# Patient Record
Sex: FEMALE | Race: WHITE
[De-identification: names, ages, dates, MRNs, and addresses within clinical notes are randomized per-mention and may not be internally consistent; named-entity substitution may affect disease eponyms.]

---

## 2019-07-01 ENCOUNTER — HOSPITAL ENCOUNTER (EMERGENCY)
Dept: HOSPITAL 63 - ER | Age: 44
Discharge: ANOTHER HOSPITAL | End: 2019-07-01
Payer: COMMERCIAL

## 2019-07-01 VITALS — BODY MASS INDEX: 27 KG/M2 | HEIGHT: 66 IN | WEIGHT: 168 LBS

## 2019-07-01 VITALS — SYSTOLIC BLOOD PRESSURE: 120 MMHG | DIASTOLIC BLOOD PRESSURE: 65 MMHG

## 2019-07-01 DIAGNOSIS — R11.2: ICD-10-CM

## 2019-07-01 DIAGNOSIS — Z90.49: ICD-10-CM

## 2019-07-01 DIAGNOSIS — R42: ICD-10-CM

## 2019-07-01 DIAGNOSIS — K37: Primary | ICD-10-CM

## 2019-07-01 LAB
ALBUMIN SERPL-MCNC: 3.5 G/DL (ref 3.4–5)
ALBUMIN/GLOB SERPL: 0.8 {RATIO} (ref 1–1.7)
ALP SERPL-CCNC: 126 U/L (ref 46–116)
ALT SERPL-CCNC: 98 U/L (ref 14–59)
ANION GAP SERPL CALC-SCNC: 11 MMOL/L (ref 6–14)
APTT PPP: (no result) S
AST SERPL-CCNC: 44 U/L (ref 15–37)
BACTERIA #/AREA URNS HPF: (no result) /HPF
BASOPHILS # BLD AUTO: 0.1 X10^3/UL (ref 0–0.2)
BASOPHILS NFR BLD: 1 % (ref 0–3)
BILIRUB SERPL-MCNC: 1.8 MG/DL (ref 0.2–1)
BILIRUB UR QL STRIP: (no result)
BUN/CREAT SERPL: 9 (ref 6–20)
CA-I SERPL ISE-MCNC: 8 MG/DL (ref 7–20)
CALCIUM SERPL-MCNC: 9.4 MG/DL (ref 8.5–10.1)
CHLORIDE SERPL-SCNC: 100 MMOL/L (ref 98–107)
CO2 SERPL-SCNC: 25 MMOL/L (ref 21–32)
CREAT SERPL-MCNC: 0.9 MG/DL (ref 0.6–1)
EOSINOPHIL NFR BLD: 0 % (ref 0–3)
EOSINOPHIL NFR BLD: 0 X10^3/UL (ref 0–0.7)
ERYTHROCYTE [DISTWIDTH] IN BLOOD BY AUTOMATED COUNT: 13.8 % (ref 11.5–14.5)
FIBRINOGEN PPP-MCNC: (no result) MG/DL
GFR SERPLBLD BASED ON 1.73 SQ M-ARVRAT: 68.7 ML/MIN
GLOBULIN SER-MCNC: 4.6 G/DL (ref 2.2–3.8)
GLUCOSE SERPL-MCNC: 108 MG/DL (ref 70–99)
GLUCOSE UR STRIP-MCNC: (no result) MG/DL
HCT VFR BLD CALC: 40.5 % (ref 36–47)
HGB BLD-MCNC: 13.6 G/DL (ref 12–15.5)
LIPASE: 68 U/L (ref 73–393)
LYMPHOCYTES # BLD: 1.4 X10^3/UL (ref 1–4.8)
LYMPHOCYTES NFR BLD AUTO: 11 % (ref 24–48)
MCH RBC QN AUTO: 30 PG (ref 25–35)
MCHC RBC AUTO-ENTMCNC: 34 G/DL (ref 31–37)
MCV RBC AUTO: 88 FL (ref 79–100)
MONO #: 0.8 X10^3/UL (ref 0–1.1)
MONOCYTES NFR BLD: 6 % (ref 0–9)
NEUT #: 10.7 X10^3UL (ref 1.8–7.7)
NEUTROPHILS NFR BLD AUTO: 82 % (ref 31–73)
NITRITE UR QL STRIP: (no result)
PLATELET # BLD AUTO: 443 X10^3/UL (ref 140–400)
POTASSIUM SERPL-SCNC: 3.5 MMOL/L (ref 3.5–5.1)
PROT SERPL-MCNC: 8.1 G/DL (ref 6.4–8.2)
RBC # BLD AUTO: 4.58 X10^6/UL (ref 3.5–5.4)
RBC #/AREA URNS HPF: (no result) /HPF (ref 0–2)
SODIUM SERPL-SCNC: 136 MMOL/L (ref 136–145)
SP GR UR STRIP: 1.02
SQUAMOUS #/AREA URNS LPF: (no result) /LPF
UROBILINOGEN UR-MCNC: 2 MG/DL
WBC # BLD AUTO: 13 X10^3/UL (ref 4–11)
WBC #/AREA URNS HPF: (no result) /HPF (ref 0–4)

## 2019-07-01 PROCEDURE — 74177 CT ABD & PELVIS W/CONTRAST: CPT

## 2019-07-01 PROCEDURE — 93005 ELECTROCARDIOGRAM TRACING: CPT

## 2019-07-01 PROCEDURE — 36415 COLL VENOUS BLD VENIPUNCTURE: CPT

## 2019-07-01 PROCEDURE — 96361 HYDRATE IV INFUSION ADD-ON: CPT

## 2019-07-01 PROCEDURE — 81001 URINALYSIS AUTO W/SCOPE: CPT

## 2019-07-01 PROCEDURE — 99285 EMERGENCY DEPT VISIT HI MDM: CPT

## 2019-07-01 PROCEDURE — 85025 COMPLETE CBC W/AUTO DIFF WBC: CPT

## 2019-07-01 PROCEDURE — 96365 THER/PROPH/DIAG IV INF INIT: CPT

## 2019-07-01 PROCEDURE — 80053 COMPREHEN METABOLIC PANEL: CPT

## 2019-07-01 PROCEDURE — 83690 ASSAY OF LIPASE: CPT

## 2019-07-01 PROCEDURE — 81025 URINE PREGNANCY TEST: CPT

## 2019-07-01 PROCEDURE — 96375 TX/PRO/DX INJ NEW DRUG ADDON: CPT

## 2019-07-01 NOTE — EKG
[facility] [address], [place] [address]

Test Date:    2019               Test Time:    13:55:53

Pat Name:     SHERYL SMILEY             Department:   

Patient ID:   [id number]           Room:          

Gender:       F                        Technician:   

:          1975               Requested By: MARIA INES BAILEY

Order Number: [id number]            Reading MD:     

                                 Measurements

Intervals                              Axis          

Rate:         96                       P:            56

MN:           124                      QRS:          45

QRSD:         68                       T:            36

QT:           320                                    

QTc:          410                                    

                           Interpretive Statements

SINUS RHYTHM

QRS(T) CONTOUR ABNORMALITY

CONSIDER ANTEROLATERAL MYOCARDIAL DAMAGE

POSSIBLY ABNORMAL ECG

RI6.01

No previous ECG available for comparison

## 2019-07-01 NOTE — PHYS DOC
Past History


Past Medical History:  No Pertinent History


Past Surgical History:  Cholecystectomy


Alcohol Use:  Occasionally


Drug Use:  None





Adult General


Chief Complaint


Chief Complaint:  ABDOMINAL PAIN





HPI


HPI





Patient is a 44 year old female who presents with a 4 day history of abdominal 

pain. She reports onset of pain starting Thursday (6/27) and was diffuse, with 

the left side and periumbilical region hurting the worse. She started feeling 

very nauseous and lightheaded, causing her to feint. After this incident she 

started having multiple episodes of emesis, with the last episode occurring 

Friday. Her pain has become more localized since friday, and now it is 

predominantly in the RLQ. She rates it a 7/10 and describes it as a pulsating 

pain that changes from sharp to dull. Last night she had a temperature of 100.7 

degrees F. Her only abdominal surgery was a cholecystectomy in September of 2016

or 2017. She denies any sick contacts, diarrhea, blood in her stool, or chest 

pain. She reported having a "small" bowel movement earlier this morning.





Review of Systems


Review of Systems





Constitutional:Admits to feeling feverish. Denies chills.


Eyes: Denies change in visual acuity, redness, or eye pain 


HENT: Denies nasal congestion or sore throat 


Respiratory: Denies cough. Admits to pain-induced shortness of breath 


Cardiovascular: No additional information not addressed in HPI 


GI: Denies, bloody stools or diarrhea. Admits to RLQ abdominal pain, nausea and 

vomiting. 


: Admits dysuria


Musculoskeletal: Denies back pain or joint pain 


Integument: Denies rash or skin lesions 


Neurologic: Denies focal weakness or sensory changes. Admits to headache





All other systems were reviewed and found to be within normal limits, except as 

documented in this note.





Current Medications


Current Medications





Current Medications








 Medications


  (Trade)  Dose


 Ordered  Sig/Jose  Start Time


 Stop Time Status Last Admin


Dose Admin


 


 Fentanyl Citrate


  (Fentanyl 2ml


 Vial)  50 mcg  1X  ONCE  7/1/19 14:00


 7/1/19 14:09 DC 7/1/19 14:15


50 MCG


 


 Iohexol


  (Omnipaque 300


 Mg/ml)  75 ml  1X  ONCE  7/1/19 14:15


 7/1/19 14:16 DC 7/1/19 14:52


75 ML


 


 Ondansetron HCl


  (Zofran)  4 mg  1X  ONCE  7/1/19 14:00


 7/1/19 14:09 DC 7/1/19 14:13


4 MG


 


 Sodium Chloride  1,000 ml @ 


 1,000 mls/hr  1X  ONCE  7/1/19 14:00


 7/1/19 14:59 DC 7/1/19 14:11


1,000 MLS/HR











Allergies


Allergies





Allergies








Coded Allergies Type Severity Reaction Last Updated Verified


 


  No Known Drug Allergies    7/1/19 No











Physical Exam


Physical Exam





Constitutional: Well developed, well nourished, pt in mild distress d/t pain.


HENT: Normocephalic, atraumatic, bilateral external ears normal, oropharynx 

moist, no oral exudates, nose normal. 


Eyes: PERRLA, EOMI, conjunctiva normal, no discharge. 


Neck: Normal range of motion, no tenderness, supple, no stridor. 


Cardiovascular:Heart rate regular rhythm, no murmur 


Lungs & Thorax:  Bilateral breath sounds clear to auscultation 


Abdomen: Bowel sounds normal, soft, no masses, no pulsatile masses. TTP in RLQ, 

positive Psoas sign on right. 


Skin: Warm, dry, no erythema, no rash. 


Back: No tenderness, slight CVA tenderness. 


Extremities: No tenderness, no cyanosis, no clubbing, ROM intact, no edema. 


Neurologic: Alert and oriented X 3, normal motor function, normal sensory 

function, no focal deficits noted.





Current Patient Data


Vital Signs





                                   Vital Signs








  Date Time  Temp Pulse Resp B/P (MAP) Pulse Ox O2 Delivery O2 Flow Rate FiO2


 


7/1/19 15:00  90 20 120/66 (84) 96 Room Air  


 


7/1/19 13:15 98.5       








Lab Results





                                Laboratory Tests








Test


 7/1/19


13:20 7/1/19


13:35 7/1/19


13:53


 


Urine Collection Type Unknown    


 


Urine Color Carol    


 


Urine Clarity Cloudy    


 


Urine pH 5.5    


 


Urine Specific Gravity 1.025    


 


Urine Protein


 100 mg/dl


(NEG-TRACE) 


 





 


Urine Glucose (UA)


 Neg mg/dL


(NEG) 


 





 


Urine Ketones (Stick)


 >=160 mg/dL


(NEG) 


 





 


Urine Blood Trace (NEG)    


 


Urine Nitrite Neg (NEG)    


 


Urine Bilirubin Mod (NEG)    


 


Urine Urobilinogen Dipstick


 2 mg/dL (0.2


mg/dL) 


 





 


Urine Leukocyte Esterase Neg (NEG)    


 


Urine RBC


 1-2 /HPF (0-2)


 


 





 


Urine WBC


 Occ /HPF (0-4)


 


 





 


Urine Squamous Epithelial


Cells Many /LPF  


 


 





 


Urine Bacteria


 Few /HPF


(0-FEW) 


 





 


Urine Mucus Mod /LPF    


 


White Blood Count


 


 13.0 x10^3/uL


(4.0-11.0)  H 





 


Red Blood Count


 


 4.58 x10^6/uL


(3.50-5.40) 





 


Hemoglobin


 


 13.6 g/dL


(12.0-15.5) 





 


Hematocrit


 


 40.5 %


(36.0-47.0) 





 


Mean Corpuscular Volume


 


 88 fL ()


 





 


Mean Corpuscular Hemoglobin  30 pg (25-35)   


 


Mean Corpuscular Hemoglobin


Concent 


 34 g/dL


(31-37) 





 


Red Cell Distribution Width


 


 13.8 %


(11.5-14.5) 





 


Platelet Count


 


 443 x10^3/uL


(140-400)  H 





 


Neutrophils (%) (Auto)  82 % (31-73)  H 


 


Lymphocytes (%) (Auto)  11 % (24-48)  L 


 


Monocytes (%) (Auto)  6 % (0-9)   


 


Eosinophils (%) (Auto)  0 % (0-3)   


 


Basophils (%) (Auto)  1 % (0-3)   


 


Neutrophils # (Auto)


 


 10.7 x10^3uL


(1.8-7.7)  H 





 


Lymphocytes # (Auto)


 


 1.4 x10^3/uL


(1.0-4.8) 





 


Monocytes # (Auto)


 


 0.8 x10^3/uL


(0.0-1.1) 





 


Eosinophils # (Auto)


 


 0.0 x10^3/uL


(0.0-0.7) 





 


Basophils # (Auto)


 


 0.1 x10^3/uL


(0.0-0.2) 





 


Sodium Level


 


 136 mmol/L


(136-145) 





 


Potassium Level


 


 3.5 mmol/L


(3.5-5.1) 





 


Chloride Level


 


 100 mmol/L


() 





 


Carbon Dioxide Level


 


 25 mmol/L


(21-32) 





 


Anion Gap  11 (6-14)   


 


Blood Urea Nitrogen


 


 8 mg/dL (7-20)


 





 


Creatinine


 


 0.9 mg/dL


(0.6-1.0) 





 


Estimated GFR


(Cockcroft-Gault) 


 68.7  


 





 


BUN/Creatinine Ratio  9 (6-20)   


 


Glucose Level


 


 108 mg/dL


(70-99)  H 





 


Calcium Level


 


 9.4 mg/dL


(8.5-10.1) 





 


Total Bilirubin


 


 1.8 mg/dL


(0.2-1.0)  H 





 


Aspartate Amino Transferase


(AST) 


 44 U/L (15-37)


H 





 


Alanine Aminotransferase (ALT)


 


 98 U/L (14-59)


H 





 


Alkaline Phosphatase


 


 126 U/L


()  H 





 


Total Protein


 


 8.1 g/dL


(6.4-8.2) 





 


Albumin


 


 3.5 g/dL


(3.4-5.0) 





 


Albumin/Globulin Ratio


 


 0.8 (1.0-1.7)


L 





 


Lipase


 


 68 U/L


()  L 





 


POC Urine HCG, Qualitative


 


 


 hcg negative


(Negative)











EKG


EKG


Normal sinus rhythm rate 96 no acute ischemic changes noted interpreted by me 

time of encounter[]





Radiology/Procedures


Radiology/Procedures


[]


Impressions:





Appendix is distended with fluid. Small appendicolith is suggested 


proximally within the appendix. There may be a very small droplet or 2 of 


gas about the distal appendix. Surrounding inflammatory stranding is noted


about the appendix within the right iliac fossa. Terminal ileum is 


unremarkable. Minimal fluid within the right iliac fossa noted. Minimal 


pelvic fluid along the right pelvic sidewall noted. Bilateral adnexal 


follicles are present. Mildly prominent right adnexal region vasculature 


is noted. Minimal fluid about the right axis adnexal region may be 


physiologic or reactive.





Course & Med Decision Making


Course & Med Decision Making


Pertinent Labs and Imaging studies reviewed. (See chart for details)





44 year old female presenting with 4 day history of diffuse abdominal pain with 

n/v that is now localized to the RLQ. Patient has had one abdominal surgery, a 

cholecystectomy, 2.5-3.5 years ago. She was TTP in the RLQ and had a positive 

psoas sign. Her history and physical make appendicitis a likely diagnosis. A 

CBC, CMP, EKG, Lipase, UA, and urine pregnancy test were ordered to rule in 

infection/inflammation and rule out other possible etiologies causing her pain 

(pancreatitis, UTI, ectopic). A CT Abd/pelvis w/ contrast was ordered to confirm

 a diagnosis of appendicitis. 











i d/w kasey at [place] at 330 pm, asks transfer to [place].





will get accepting hospitalist then transfer pt





Estuardo Disclaimer


Dragon Disclaimer


This electronic medical record was generated, in whole or in part, using a voice

 recognition dictation system.





Departure


Departure:


Impression:  


   Primary Impression:  


   Appendicitis


Disposition:  02 XFER [facility] HOSP


Condition:  STABLE


Referrals:  


MERRY HANDY MD (PCP)











MARIA INES BAILEY MD             Jul 1, 2019 15:34

## 2019-07-01 NOTE — RAD
Examination: CT ABD PELV W/ IV CONTRST ONLY

 

History: Right lower quadrant pain, nausea and vomiting

 

Comparison/Correlation: None

 

Findings: Axial images of the abdomen and pelvis were obtained following 

IV contrast. Sagittal and coronal reformatted images were provided.

 

Visualized lung bases are clear. Small hiatal hernia is present. 

Cholecystectomy noted. Liver is unremarkable. Spleen, pancreas, adrenal 

glands, and kidneys are unremarkable.

 

Appendix is distended with fluid. Small appendicolith is suggested 

proximally within the appendix. There may be a very small droplet or 2 of 

gas about the distal appendix. Surrounding inflammatory stranding is noted

about the appendix within the right iliac fossa. Terminal ileum is 

unremarkable. Minimal fluid within the right iliac fossa noted. Minimal 

pelvic fluid along the right pelvic sidewall noted. Bilateral adnexal 

follicles are present. Mildly prominent right adnexal region vasculature 

is noted. Minimal fluid about the right axis adnexal region may be 

physiologic or reactive.

 

Uterus is normal. Urinary bladder is unremarkable.

 

No extraluminal gas. No bowel obstruction.

 

Mild L3-4 concentric disc bulge. Slight L4-5 interspace narrowing. Mild 

concentric disc bulge at L5-S1. 

 

 

Impression:

Acute appendicitis. No abscess. Significant surrounding inflammatory 

changes noted however. Minimal reactive fluid noted. A droplet or 2 of gas

about the distal appendix raises question of minimal perforation.

 

On 7/1/2019 at 3:19 PM, results discussed with the referring emergency 

room physician.

 

 

PQRS Compliance Statement:

 

One or more of the following individualized dose reduction techniques were

utilized for this examination:  

1. Automated exposure control  

2. Adjustment of the mA and/or kV according to patient size  

3. Use of iterative reconstruction technique

 

Electronically signed by: Gerardo Elias MD (7/1/2019 3:19 PM) [facility]